# Patient Record
Sex: MALE | Race: WHITE | NOT HISPANIC OR LATINO | URBAN - METROPOLITAN AREA
[De-identification: names, ages, dates, MRNs, and addresses within clinical notes are randomized per-mention and may not be internally consistent; named-entity substitution may affect disease eponyms.]

---

## 2019-08-17 ENCOUNTER — OFFICE VISIT - RIVER FALLS (OUTPATIENT)
Dept: FAMILY MEDICINE | Facility: CLINIC | Age: 49
End: 2019-08-17

## 2019-08-18 ENCOUNTER — OFFICE VISIT - RIVER FALLS (OUTPATIENT)
Dept: FAMILY MEDICINE | Facility: CLINIC | Age: 49
End: 2019-08-18

## 2022-02-11 VITALS
SYSTOLIC BLOOD PRESSURE: 140 MMHG | WEIGHT: 230 LBS | DIASTOLIC BLOOD PRESSURE: 92 MMHG | SYSTOLIC BLOOD PRESSURE: 146 MMHG | HEART RATE: 82 BPM | HEART RATE: 78 BPM | WEIGHT: 230.2 LBS | TEMPERATURE: 98 F | TEMPERATURE: 98.7 F | DIASTOLIC BLOOD PRESSURE: 90 MMHG

## 2022-02-16 NOTE — PROGRESS NOTES
Chief Complaint    Back pain continues, worse today.  History of Present Illness      Chief complaint as above reviewed and confirmed with patient.  Pt presents to the clinic with concerns re: back pain.  Seen yesterday. No improvement with muscle relaxant and needs to drive home 5 hours and having moderate to severe pain.  Denies any change in character of pain.  No bowel or bladder dysfunction.  NO abodominal pain. No tingling or numbness.  Pt hoping to get home so her can see his spine specialist early in the week and also start PT which he has done in the past for similar problems.  Review of Systems      Review of systems is negative with the exception of those noted in HPI          Physical Exam   Vitals & Measurements    T: 98.7   F (Tympanic)  HR: 82(Peripheral)  BP: 146/92     WT: 230 lb       pt in distress uncomfortable sitting, moving rigidly.       back TTP L1-2 region.      M strength sensation and DTR are symetrical and WNL for LE B.      Toradol 60 mg IM given. Pt observed and tolerated well.         Assessment/Plan       Back pain (M54.9)         prednisone and short course of norco to get home to fu with pcp or spine specialist. Discussed no refills would be given.  REviewed [place] [facility] database and patient has no controlled substances prescribed in the last year. caution re: sedation, addition potential, pruritis, nausea/vomiting.         Ordered:          ketorolac, 60 mg, im, once, (Completed)          77836 therapeutic prophylactic/dx injection subq/im (Charge), Quantity: 1, Back pain          85297 office outpatient new 20 minutes (Charge), Quantity: 1, Back pain           ketorolac tromethamine inj, 15 mg (Charge), Quantity: 4, Back pain                Orders:         acetaminophen-hydrocodone, 1 tab(s), po, q4-6 hrs, # 10 tab(s), 0 Refill(s), Type: Acute, Pharmacy: [facility] #[id number], 1 tab(s) Oral q4-6 hrs, (Ordered)         methocarbamol, = 2 tab(s) ( 1,000 mg ), Oral, tid, x 7  day(s), # 42 tab(s), 0 Refill(s), Type: Acute, Pharmacy: [facility] #[id number], 2 tab(s) Oral tid,x7 day(s), (Ordered)         predniSONE, = 2 tab(s) ( 40 mg ), Oral, daily, x 5 day(s), # 10 tab(s), 0 Refill(s), Type: Acute, Pharmacy: [facility] #[id number], 2 tab(s) Oral daily,x5 day(s), (Ordered)  Patient Information     Name:ERIC ACUÑA      Address:      [address] [place] [id number]     Sex:Male     YOB: 1970     [phone]     Emergency Contact:[facility] EMERGENCY, CONTACT     MRN:[id number]     FIN:[id number]     Location:[facility]     Date of Service:08/18/2019      Primary Care Physician:       NONE ,       Attending Physician:       Carmelo REYES, Breana CONNER, [phone]  Problem List/Past Medical History    Ongoing     No qualifying data    Historical     No qualifying data  Medications    Norco 5 mg-325 mg oral tablet, 1 tab(s), Oral, q4-6 hrs    predniSONE 20 mg oral tablet, 40 mg= 2 tab(s), Oral, daily    Robaxin 500 mg oral tablet, 1000 mg= 2 tab(s), Oral, tid  Allergies    No Known Medication Allergies  Social History    Smoking Status - 08/17/2019     Former smoker

## 2022-02-16 NOTE — TELEPHONE ENCOUNTER
---------------------  From: Phyllis Szymanski LPN (Phone Messages Pool ([id number]_[facility]))   To: [facility] Message Pool ([id number]_[place] [facility] [place]);     Sent: 9/9/2019 9:17:57 AM CDT  Subject: General Message       Phone Message Template      PCP:   none      Time of Call:  0856       Person Calling:  Dominik  Phone number:  [phone]    Returned call at: _    Note:  notified clinic that he will be faxing some PPW to [facility] to be filled out for work    Last office visit and reason:  08/18/16, Back pain.

## 2022-02-16 NOTE — NURSING NOTE
Comprehensive Intake Entered On:  8/17/2019 10:29 AM CDT    Performed On:  8/17/2019 10:23 AM CDT by Teresa Velasquez CMA               Summary   Chief Complaint :   c/o low middle back pain for the past week and not getting better   Weight Measured :   230.2 lb(Converted to: 230 lb 3 oz, 104.42 kg)    Systolic Blood Pressure :   140 mmHg (HI)    Diastolic Blood Pressure :   90 mmHg (HI)    Mean Arterial Pressure :   107 mmHg   Peripheral Pulse Rate :   78 bpm   BP Site :   Right arm   Pulse Site :   Radial artery   BP Method :   Manual   HR Method :   Manual   Temperature Tympanic :   98 DegF(Converted to: 36.7 DegC)    Pain Present :   Yes actual or suspected pain   Primary Pain Location :   Back   Teresa Velasquez CMA - 8/17/2019 10:23 AM CDT   Health Status   Allergies Verified? :   Yes   Medication History Verified? :   Yes   Medical History Verified? :   No   Pre-Visit Planning Status :   Not completed   Tobacco Use? :   Former smoker   Teresa Velasquez CMA - 8/17/2019 10:23 AM CDT   Consents   Consent for Immunization Exchange :   Consent Granted   Consent for Immunizations to Providers :   Consent Granted   Teresa Velasquez CMA - 8/17/2019 10:23 AM CDT   Meds / Allergies   (As Of: 8/17/2019 10:29:35 AM CDT)   Allergies (Active)   No Known Medication Allergies  Estimated Onset Date:   Unspecified ; Created By:   Teresa Velasquez CMA; Reaction Status:   Active ; Category:   Drug ; Substance:   No Known Medication Allergies ; Type:   Allergy ; Updated By:   Teresa Velasquez CMA; Reviewed Date:   8/17/2019 10:26 AM CDT        Medication List   (As Of: 8/17/2019 10:29:35 AM CDT)

## 2022-02-16 NOTE — TELEPHONE ENCOUNTER
DOMENICOERIC SALAZAR MONICA : 1970 MRN: [id number]  AFTER HOURS PHONE NOTE: [phone]  Time Paged: 8:15 a.m.   Time Call Retuned: 8:17 a.m.   S: I returned the call but there was no answer.  I called again at 8:40 a.m. and spoke with the patient.  He was seen yesterday in Urgent Care for back pain and prescribed muscle relaxers.  He reports that the muscle relaxer is not working and today he is having severe pain with any movement.    P: I told him that we cannot prescribe stronger medication by phone and have encouraged him to follow up for further evaluation.     D:  2019  T:  2019                                                 Umm Sales MD/jennie

## 2022-02-16 NOTE — NURSING NOTE
Comprehensive Intake Entered On:  8/18/2019 10:50 AM CDT    Performed On:  8/18/2019 10:44 AM CDT by Phyllis Szymanski LPN               Summary   Chief Complaint :   Back pain continues, worse today.   Weight Measured :   230 lb(Converted to: 230 lb 0 oz, 104.33 kg)    Systolic Blood Pressure :   146 mmHg (HI)    Diastolic Blood Pressure :   92 mmHg (HI)    Mean Arterial Pressure :   110 mmHg   Peripheral Pulse Rate :   82 bpm   BP Site :   Right arm   Pulse Site :   Radial artery   BP Method :   Manual   HR Method :   Manual   Temperature Tympanic :   98.7 DegF(Converted to: 37.1 DegC)    Phlylis Szymanski LPN - 8/18/2019 10:44 AM CDT   Health Status   Allergies Verified? :   Yes   Medication History Verified? :   Yes   Phyllis Szymanski LPN - 8/18/2019 10:44 AM CDT   Consents   Consent for Immunization Exchange :   Consent Granted   Consent for Immunizations to Providers :   Consent Granted   Phyllis Szymanski LPN - 8/18/2019 10:44 AM CDT   Meds / Allergies   (As Of: 8/18/2019 10:50:11 AM CDT)   Allergies (Active)   No Known Medication Allergies  Estimated Onset Date:   Unspecified ; Created By:   Teresa Velasquez CMA; Reaction Status:   Active ; Category:   Drug ; Substance:   No Known Medication Allergies ; Type:   Allergy ; Updated By:   Teresa Velasquez CMA; Reviewed Date:   8/17/2019 10:26 AM CDT        Medication List   (As Of: 8/18/2019 10:50:11 AM CDT)   Prescription/Discharge Order    methocarbamol  :   methocarbamol ; Status:   Prescribed ; Ordered As Mnemonic:   Robaxin 500 mg oral tablet ; Simple Display Line:   1,000 mg, 2 tab(s), Oral, tid, for 7 day(s), 42 tab(s), 0 Refill(s) ; Ordering Provider:   Breana Rhodes PA-C; Catalog Code:   methocarbamol ; Order Dt/Tm:   8/17/2019 10:42:03 AM

## 2022-02-16 NOTE — PROGRESS NOTES
Chief Complaint    c/o low middle back pain for the past week and not getting better  History of Present Illness      Chief complaint as above reviewed and confirmed with patient.  Pt presents to the clinic with concerns re:back pain.  He is visiting [facility] for a wedding and to visit [facility].  Spent the day walking around campus.  HAs a hx of lower back pain, drove 5 hours yesterday, last noc developed pain in the low back but not as low as it typically is.  Sat in hot tub and pool last night and felt much better but ambulating today more painful.  Slept well. No tingling numbness or weakness.  no bowel or bladder sx. No abdominal pain.  Took Naproxen without improvement.  No hx of cancer. No saddle peristhesia.       PMH: unremarkable other than back pain.  Review of Systems      Review of systems is negative with the exception of those noted in HPI          Physical Exam   Vitals & Measurements    T: 98   F (Tympanic)  HR: 78(Peripheral)  BP: 140/90     WT: 230.2 lb       Exam of the back reveals no midline tenderness of the T or L spine      Pt able to forward flex: to touch tibia      Resuming upright does not increase pain.        Pain with Flexion, extension, lateral flexion and rotation B.       Muscular strength, sensation and DTR are symetrical and WNL for LE B.        SLR negative B      Figure 4 negative B        No CVAT       Abdomen: BS active, soft, nontender to light or deep palpation.  no pulsatile masses       Peripheral pulses intact at femoral and DP pulses          Assessment/Plan       1. Back pain (M54.9)         robaxin as ordered.  continue naproxen, ice, heat, reative rest.  fu wiht pcp for persistent and worsening sx when returns to home [facility] Monday.  Pt agreeable.       Orders:         methocarbamol, = 2 tab(s) ( 1,000 mg ), Oral, tid, x 7 day(s), # 42 tab(s), 0 Refill(s), Type: Acute, Pharmacy: [facility] #[id number], 2 tab(s) Oral tid,x7 day(s), (Ordered)  Patient Information      Name:ERIC ACUÑA      Address:      [address] [place] [id number]     Sex:Male     YOB: 1970     [phone]     Emergency Contact:[facility] EMERGENCY, CONTACT     MRN:[id number]     FIN:[id number]     Location:[facility]     Date of Service:08/17/2019      Primary Care Physician:       NONE ,       Attending Physician:       Carmelo REYES, Breana CONNER, [phone]  Problem List/Past Medical History    Ongoing     No qualifying data    Historical     No qualifying data  Medications    Robaxin 500 mg oral tablet, 1000 mg= 2 tab(s), Oral, tid  Allergies    No Known Medication Allergies  Social History    Smoking Status - 08/17/2019     Former smoker

## 2024-10-23 ENCOUNTER — HOSPITAL ENCOUNTER (EMERGENCY)
Age: 54
Discharge: HOME | End: 2024-10-23
Attending: EMERGENCY MEDICINE

## 2024-10-23 ENCOUNTER — APPOINTMENT (OUTPATIENT)
Dept: GENERAL RADIOLOGY | Age: 54
End: 2024-10-23
Attending: EMERGENCY MEDICINE

## 2024-10-23 VITALS
DIASTOLIC BLOOD PRESSURE: 71 MMHG | OXYGEN SATURATION: 96 % | HEART RATE: 78 BPM | TEMPERATURE: 97.4 F | RESPIRATION RATE: 18 BRPM | SYSTOLIC BLOOD PRESSURE: 142 MMHG

## 2024-10-23 DIAGNOSIS — J06.9 UPPER RESPIRATORY TRACT INFECTION, UNSPECIFIED TYPE: Primary | ICD-10-CM

## 2024-10-23 LAB
FLUAV RNA RESP QL NAA+PROBE: NOT DETECTED
FLUBV RNA RESP QL NAA+PROBE: NOT DETECTED
RSV AG NPH QL IA.RAPID: NOT DETECTED
SARS-COV-2 RNA RESP QL NAA+PROBE: NOT DETECTED
SERVICE CMNT-IMP: NORMAL
SERVICE CMNT-IMP: NORMAL

## 2024-10-23 PROCEDURE — 71046 X-RAY EXAM CHEST 2 VIEWS: CPT

## 2024-10-23 PROCEDURE — 99284 EMERGENCY DEPT VISIT MOD MDM: CPT | Performed by: EMERGENCY MEDICINE

## 2024-10-23 PROCEDURE — 71046 X-RAY EXAM CHEST 2 VIEWS: CPT | Performed by: RADIOLOGY

## 2024-10-23 PROCEDURE — 99283 EMERGENCY DEPT VISIT LOW MDM: CPT

## 2024-10-23 PROCEDURE — 0241U COVID/FLU/RSV PANEL: CPT | Performed by: EMERGENCY MEDICINE

## 2024-10-23 PROCEDURE — 10002803 HB RX 637: Performed by: EMERGENCY MEDICINE

## 2024-10-23 PROCEDURE — 10002801 HB RX 250 W/O HCPCS: Performed by: EMERGENCY MEDICINE

## 2024-10-23 RX ORDER — GUAIFENESIN 1200 MG/1
1200 TABLET, EXTENDED RELEASE ORAL 2 TIMES DAILY PRN
Qty: 28 TABLET | Refills: 0 | Status: SHIPPED | OUTPATIENT
Start: 2024-10-23

## 2024-10-23 RX ORDER — CETIRIZINE HYDROCHLORIDE 10 MG/1
10 TABLET ORAL NIGHTLY
Status: DISCONTINUED | OUTPATIENT
Start: 2024-10-23 | End: 2024-10-23 | Stop reason: HOSPADM

## 2024-10-23 RX ORDER — GUAIFENESIN/DEXTROMETHORPHAN 100-10MG/5
10 SYRUP ORAL ONCE
Status: COMPLETED | OUTPATIENT
Start: 2024-10-23 | End: 2024-10-23

## 2024-10-23 RX ADMIN — GUAIFENESIN AND DEXTROMETHORPHAN 10 ML: 100; 10 SYRUP ORAL at 05:11

## 2024-10-23 RX ADMIN — CETIRIZINE HYDROCHLORIDE 10 MG: 10 TABLET ORAL at 05:10

## 2024-10-23 SDOH — SOCIAL STABILITY: SOCIAL INSECURITY: HOW OFTEN DOES ANYONE, INCLUDING FAMILY AND FRIENDS, PHYSICALLY HURT YOU?: NEVER

## 2024-10-23 SDOH — SOCIAL STABILITY: SOCIAL INSECURITY: HOW OFTEN DOES ANYONE, INCLUDING FAMILY AND FRIENDS, SCREAM OR CURSE AT YOU?: NEVER

## 2024-10-23 SDOH — SOCIAL STABILITY: SOCIAL INSECURITY: HOW OFTEN DOES ANYONE, INCLUDING FAMILY AND FRIENDS, THREATEN YOU WITH HARM?: NEVER

## 2024-10-23 SDOH — SOCIAL STABILITY: SOCIAL INSECURITY: HOW OFTEN DOES ANYONE, INCLUDING FAMILY AND FRIENDS, INSULT OR TALK DOWN TO YOU?: NEVER

## 2024-10-23 ASSESSMENT — PAIN SCALES - GENERAL
PAINLEVEL_OUTOF10: 0
PAINLEVEL_OUTOF10: 0

## 2024-10-24 ASSESSMENT — ENCOUNTER SYMPTOMS
DIAPHORESIS: 0
FATIGUE: 0
WOUND: 0
DIARRHEA: 0
CHILLS: 0
SPEECH DIFFICULTY: 0
NAUSEA: 0
ABDOMINAL PAIN: 0
DIZZINESS: 0
EYE PAIN: 0
TREMORS: 0
FACIAL ASYMMETRY: 0
VOMITING: 0
LIGHT-HEADEDNESS: 0
CHOKING: 0
BACK PAIN: 0
NUMBNESS: 0
WHEEZING: 0
EYE REDNESS: 0
WEAKNESS: 0
TROUBLE SWALLOWING: 0
SHORTNESS OF BREATH: 1
FEVER: 0
COLOR CHANGE: 0
BRUISES/BLEEDS EASILY: 0
SINUS PRESSURE: 0
CHEST TIGHTNESS: 0
ADENOPATHY: 0
AGITATION: 0
RHINORRHEA: 1
PHOTOPHOBIA: 0
SEIZURES: 0
SLEEP DISTURBANCE: 1
SINUS PAIN: 0
SORE THROAT: 0
COUGH: 1
HEADACHES: 0
CONSTIPATION: 0
FACIAL SWELLING: 0
VOICE CHANGE: 0
CONFUSION: 0